# Patient Record
Sex: FEMALE | Race: NATIVE HAWAIIAN OR OTHER PACIFIC ISLANDER | Employment: OTHER | URBAN - METROPOLITAN AREA
[De-identification: names, ages, dates, MRNs, and addresses within clinical notes are randomized per-mention and may not be internally consistent; named-entity substitution may affect disease eponyms.]

---

## 2017-04-16 ENCOUNTER — HOSPITAL ENCOUNTER (INPATIENT)
Facility: HOSPITAL | Age: 82
LOS: 3 days | Discharge: HOSPICE | DRG: 871 | End: 2017-04-20
Attending: EMERGENCY MEDICINE | Admitting: FAMILY MEDICINE
Payer: MEDICARE

## 2017-04-16 ENCOUNTER — APPOINTMENT (OUTPATIENT)
Dept: GENERAL RADIOLOGY | Facility: HOSPITAL | Age: 82
DRG: 871 | End: 2017-04-16
Attending: EMERGENCY MEDICINE
Payer: MEDICARE

## 2017-04-16 DIAGNOSIS — R65.20 SEVERE SEPSIS(995.92): ICD-10-CM

## 2017-04-16 DIAGNOSIS — R06.00 DYSPNEA, UNSPECIFIED TYPE: Primary | ICD-10-CM

## 2017-04-16 DIAGNOSIS — A41.9 SEVERE SEPSIS(995.92): ICD-10-CM

## 2017-04-16 DIAGNOSIS — J18.9 COMMUNITY ACQUIRED PNEUMONIA: ICD-10-CM

## 2017-04-16 PROCEDURE — 71010 XR CHEST AP PORTABLE  (CPT=71010): CPT

## 2017-04-16 RX ORDER — NITROGLYCERIN 0.4 MG/1
0.4 TABLET SUBLINGUAL ONCE
Status: COMPLETED | OUTPATIENT
Start: 2017-04-16 | End: 2017-04-16

## 2017-04-16 RX ORDER — NITROGLYCERIN 0.4 MG/1
TABLET SUBLINGUAL
Status: COMPLETED
Start: 2017-04-16 | End: 2017-04-16

## 2017-04-16 RX ORDER — FUROSEMIDE 10 MG/ML
40 INJECTION INTRAMUSCULAR; INTRAVENOUS ONCE
Status: COMPLETED | OUTPATIENT
Start: 2017-04-16 | End: 2017-04-16

## 2017-04-16 RX ORDER — IPRATROPIUM BROMIDE AND ALBUTEROL SULFATE 2.5; .5 MG/3ML; MG/3ML
3 SOLUTION RESPIRATORY (INHALATION) ONCE
Status: COMPLETED | OUTPATIENT
Start: 2017-04-16 | End: 2017-04-16

## 2017-04-17 ENCOUNTER — APPOINTMENT (OUTPATIENT)
Dept: CT IMAGING | Facility: HOSPITAL | Age: 82
DRG: 871 | End: 2017-04-17
Attending: EMERGENCY MEDICINE
Payer: MEDICARE

## 2017-04-17 PROBLEM — Z91.81 AT RISK FOR FALLING: Status: ACTIVE | Noted: 2017-04-17

## 2017-04-17 PROBLEM — R06.00 DYSPNEA: Status: ACTIVE | Noted: 2017-04-17

## 2017-04-17 PROBLEM — J18.9 COMMUNITY ACQUIRED PNEUMONIA: Status: ACTIVE | Noted: 2017-04-17

## 2017-04-17 PROBLEM — E87.3 METABOLIC ALKALOSIS: Status: ACTIVE | Noted: 2017-04-17

## 2017-04-17 PROBLEM — D72.829 LEUKOCYTOSIS: Status: ACTIVE | Noted: 2017-04-17

## 2017-04-17 PROBLEM — R06.00 DYSPNEA, UNSPECIFIED TYPE: Status: ACTIVE | Noted: 2017-04-17

## 2017-04-17 PROBLEM — A41.9 SEVERE SEPSIS(995.92): Status: ACTIVE | Noted: 2017-04-17

## 2017-04-17 PROBLEM — R65.20 SEVERE SEPSIS(995.92): Status: ACTIVE | Noted: 2017-04-17

## 2017-04-17 PROBLEM — R73.9 HYPERGLYCEMIA: Status: ACTIVE | Noted: 2017-04-17

## 2017-04-17 PROCEDURE — 71275 CT ANGIOGRAPHY CHEST: CPT

## 2017-04-17 RX ORDER — MIRTAZAPINE 15 MG/1
15 TABLET, FILM COATED ORAL NIGHTLY
Status: DISCONTINUED | OUTPATIENT
Start: 2017-04-17 | End: 2017-04-20

## 2017-04-17 RX ORDER — CLOPIDOGREL BISULFATE 75 MG/1
75 TABLET ORAL DAILY
Status: DISCONTINUED | OUTPATIENT
Start: 2017-04-17 | End: 2017-04-20

## 2017-04-17 RX ORDER — HEPARIN SODIUM 5000 [USP'U]/ML
5000 INJECTION, SOLUTION INTRAVENOUS; SUBCUTANEOUS EVERY 8 HOURS SCHEDULED
Status: DISCONTINUED | OUTPATIENT
Start: 2017-04-17 | End: 2017-04-20

## 2017-04-17 RX ORDER — CARVEDILOL 12.5 MG/1
12.5 TABLET ORAL 2 TIMES DAILY WITH MEALS
Status: DISCONTINUED | OUTPATIENT
Start: 2017-04-17 | End: 2017-04-20

## 2017-04-17 RX ORDER — DEXTROSE AND SODIUM CHLORIDE 5; .45 G/100ML; G/100ML
INJECTION, SOLUTION INTRAVENOUS CONTINUOUS
Status: DISCONTINUED | OUTPATIENT
Start: 2017-04-17 | End: 2017-04-20

## 2017-04-17 RX ORDER — ASPIRIN 81 MG/1
81 TABLET, CHEWABLE ORAL DAILY
Status: DISCONTINUED | OUTPATIENT
Start: 2017-04-17 | End: 2017-04-20

## 2017-04-17 RX ORDER — IPRATROPIUM BROMIDE AND ALBUTEROL SULFATE 2.5; .5 MG/3ML; MG/3ML
3 SOLUTION RESPIRATORY (INHALATION) EVERY 4 HOURS PRN
Status: DISCONTINUED | OUTPATIENT
Start: 2017-04-17 | End: 2017-04-20

## 2017-04-17 RX ORDER — AMLODIPINE BESYLATE 5 MG/1
10 TABLET ORAL DAILY
Status: DISCONTINUED | OUTPATIENT
Start: 2017-04-17 | End: 2017-04-20

## 2017-04-17 RX ORDER — ENOXAPARIN SODIUM 100 MG/ML
40 INJECTION SUBCUTANEOUS DAILY
Status: DISCONTINUED | OUTPATIENT
Start: 2017-04-17 | End: 2017-04-17

## 2017-04-17 RX ORDER — PANTOPRAZOLE SODIUM 40 MG/1
40 TABLET, DELAYED RELEASE ORAL
Status: DISCONTINUED | OUTPATIENT
Start: 2017-04-18 | End: 2017-04-20

## 2017-04-17 RX ORDER — PRAVASTATIN SODIUM 20 MG
20 TABLET ORAL NIGHTLY
Status: DISCONTINUED | OUTPATIENT
Start: 2017-04-17 | End: 2017-04-20

## 2017-04-17 NOTE — CONSULTS
[facility]  Report of Consultation    Natalia Gaona Patient Status:  Inpatient    1932 MRN [id number]   [facility] 5NW-A Attending Demetrio Hester MD   Hosp Day # 1 PCP Sunny Avalos MD     Reason for Consultation:  Sia Mittal distress. Head: Normocephalic, without obvious abnormality, atraumatic. Eyes: Conjunctivae/corneas clear. No scleral icterus. No conjunctival     hemorrhage. Nose: Nares normal.   Throat: Lips, mucosa, and tongue normal.  No thrush noted.    Neck: S 0. 5   TEMP  98.0   DAVID  Positive   SITE  Right Radial   DEV  Bi-PAP   THGB  12.6         Cultures:       Radiology:  Reviewed  Personally       ASSESSMENT   1. PNA ( CAP? ) ,  RLLnot immunocompromised, not art risk for MRSA or PSA.  At  risk for aspirati

## 2017-04-17 NOTE — PLAN OF CARE
Diabetes/Glucose Control    • Glucose maintained within prescribed range Progressing        Patient/Family Goals    • Patient/Family Long Term Goal Progressing    • Patient/Family Short Term Goal Progressing

## 2017-04-17 NOTE — H&P
[facility]    PATIENT'S NAME: Chino Cano   ATTENDING PHYSICIAN: Joie De Los Santos M.D.    PATIENT ACCOUNT#:   [de-identified]    LOCATION:  [address] [place] [address]  MEDICAL RECORD #:   [id number]       YOB: 1932  ADMISSION DATE:       04/16/2 warm.  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 LABORATORY DATA:  Glucose was 214 in the emergency room. BUN was 30, creatinine 1.50. Lactic acid 2.6 consistent with sepsis.     Septic workup

## 2017-04-17 NOTE — PLAN OF CARE
Problem: Patient/Family Goals  Goal: Patient/Family Short Term Goal  Patient’s Short Term Goal: 4/17-resolve PNA  Interventions:   - 4/14-IV ABX,ivf  - See additional Care Plan goals for specific interventions   Outcome: Progressing

## 2017-04-17 NOTE — ED NOTES
Bi-pap was d/c'd by RT and patient has been doing well with O2 via NC at 2.5 L. Report called floor RN. Await transport to floor. Patient's family updated on plan of care.

## 2017-04-17 NOTE — PHYSICAL THERAPY NOTE
PHYSICAL THERAPY QUICK EVALUATION - INPATIENT    Room Number: 164/010-E  Evaluation Date: 4/17/2017  Presenting Problem: SOB  Physician Order: PT Eval and Treat    Problem List  Principal Problem:    Dyspnea, unspecified type  Active Problems:    Hyperglyc standing up from a chair with arms (e.g., wheelchair, bedside commode, etc.): Unable   -   Moving from lying on back to sitting on the side of the bed?: A Lot   How much help from another person does the patient currently need. ..   -   Moving to and from a ...    Patient was able to transfer At previous, functional level   Patient able to ambulate on level surfaces Unable before admission

## 2017-04-17 NOTE — ED NOTES
Ct scan ordered, screening sheet on chart, pt's IV access was discussed with CT tech. Pt has bilateral arm contractures and AC access is not available. Left arm IV was flushed and good blood return noted. Pt made ready for CTA of chest. Family at bedside.

## 2017-04-17 NOTE — RESPIRATORY THERAPY NOTE
Dr. Noelle Hameed called and asked me to take off A* from her Bipap. I took her off. At [phone]. Pt is tolerating well on 3L O2 like she uses at home. RR 24/ Hr 83/  Sats 100%.

## 2017-04-17 NOTE — ED PROVIDER NOTES
Patient Seen in: [facility] Emergency Department    History   Patient presents with:  Dyspnea ERENDIRA SOB (respiratory)    Stated Complaint: erendira    HPI    Patient is an 17-year-old female comes in emergency room for evaluation difficulty breathing.   Chester Olmedo (36.7 °C)   Temp src 04/16/17 2158 Temporal   SpO2 04/16/17 2158 88 %   O2 Device 04/16/17 2158 None (Room air)       Current:/104 mmHg  Pulse 84  Temp(Src) 98 °F (36.7 °C) (Temporal)  Resp 27  Ht 142.2 cm (4' 8\")  Wt 68.04 kg  BMI 33.65 kg/m2  SpO2 CBC W/ DIFFERENTIAL[[id number]]           Abnormal            Final result                 Please view results for these tests on the individual orders.    LACTIC ACID, PLASMA   LACTIC ACID, PLASMA   LACTIC ACID, PLASMA   BLOOD CULTURE   BLOOD CULTURE 68.04 kg  BMI 33.65 kg/m2  SpO2 100%     2:42 AM    Cardiac:  Regularity: Regular  Rate: Normal  Heart Sounds: No adventitious heart sounds    Lungs:   Right: Clear  Left: Clear    Peripheral Pulses:  Radial: Left 1+      Capillary Refill:  <3 Secs    Skin

## 2017-04-17 NOTE — PAYOR COMM NOTE
Attending Physician: Sylvester Melendez MD    Review Type: ADMISSION   Reviewer: Edwin Santiago       Date: April 17, 2017 - 12:08 PM  Payor: [address] [place] [address] Number: [id number]  Admit date: 4/16/2017  9:51 PM   Admitted from [facility] Route User    4/17/2017 1016 New Bag (none) Intravenous Natividad Soto, RN      Enoxaparin Sodium (LOVENOX) 40 MG/0.4ML injection 40 mg     Date Action Dose Route User    4/17/2017 0830 Given 40 mg Subcutaneous (Left Lower Abdomen) Natividad Soto respiratory rate of 42.   HEENT: Sclerae white, conjunctiva pink.  Pupils are reactive to light accommodation  Neck: Supple  Lungs: Patient has decreased aeration with expiratory wheezing noted in scattered fashion  Cardiovascular: Regular rate and rhythm,

## 2017-04-17 NOTE — SLP NOTE
ADULT SWALLOWING EVALUATION    ASSESSMENT & PLAN   ASSESSMENT  Patient seen for swallowing evaluation per protocol.  Patient participated in VFSS 4/5/2014 with resulting recommendation of regular consistency and thin liquids as there was no laryngeal penetr Regular; Thin liquids  Precautions: Aspiration    Patient/Family Goals:  To eat and drink safely    SWALLOWING HISTORY  Current Diet Consistency: NPO  Dysphagia History: As above  Imaging Results:   CT angiography from 4/17/17 revealed:  FINDINGS:  Multiplan Presentation: Self presentation;Spoon;Single sips; Consecutive swallows  Patient Positioning: Upright;Midline (in bed)    Oral Phase of Swallow: Impaired  Bolus Retrieval: Intact  Bilabial Seal: Intact  Bolus Formation: Intact  Bolus Propulsion: Impaired (d

## 2017-04-17 NOTE — PROGRESS NOTES
Pt arrived via cart from ED with daughter at bedside  Oriented to room and call light  Alert to self but communicating/holding conversation  Admission completed  Orders obtained

## 2017-04-18 ENCOUNTER — APPOINTMENT (OUTPATIENT)
Dept: GENERAL RADIOLOGY | Facility: HOSPITAL | Age: 82
DRG: 871 | End: 2017-04-18
Attending: FAMILY MEDICINE
Payer: MEDICARE

## 2017-04-18 ENCOUNTER — APPOINTMENT (OUTPATIENT)
Dept: GENERAL RADIOLOGY | Facility: HOSPITAL | Age: 82
DRG: 871 | End: 2017-04-18
Attending: INTERNAL MEDICINE
Payer: MEDICARE

## 2017-04-18 PROCEDURE — 99221 1ST HOSP IP/OBS SF/LOW 40: CPT | Performed by: NURSE PRACTITIONER

## 2017-04-18 PROCEDURE — 99497 ADVNCD CARE PLAN 30 MIN: CPT | Performed by: NURSE PRACTITIONER

## 2017-04-18 PROCEDURE — 71010 XR CHEST AP PORTABLE  (CPT=71010): CPT

## 2017-04-18 PROCEDURE — 74230 X-RAY XM SWLNG FUNCJ C+: CPT

## 2017-04-18 RX ORDER — POTASSIUM CHLORIDE 20 MEQ/1
40 TABLET, EXTENDED RELEASE ORAL EVERY 4 HOURS
Status: COMPLETED | OUTPATIENT
Start: 2017-04-18 | End: 2017-04-18

## 2017-04-18 RX ORDER — ALBUTEROL SULFATE 2.5 MG/3ML
2.5 SOLUTION RESPIRATORY (INHALATION) EVERY 4 HOURS PRN
Status: DISCONTINUED | OUTPATIENT
Start: 2017-04-19 | End: 2017-04-20

## 2017-04-18 RX ORDER — ALBUTEROL SULFATE 2.5 MG/3ML
10 SOLUTION RESPIRATORY (INHALATION)
Status: DISCONTINUED | OUTPATIENT
Start: 2017-04-18 | End: 2017-04-18

## 2017-04-18 RX ORDER — FUROSEMIDE 10 MG/ML
40 INJECTION INTRAMUSCULAR; INTRAVENOUS ONCE
Status: COMPLETED | OUTPATIENT
Start: 2017-04-18 | End: 2017-04-18

## 2017-04-18 RX ORDER — FUROSEMIDE 10 MG/ML
INJECTION INTRAMUSCULAR; INTRAVENOUS
Status: DISPENSED
Start: 2017-04-18 | End: 2017-04-18

## 2017-04-18 RX ORDER — ALBUTEROL SULFATE 2.5 MG/3ML
2.5 SOLUTION RESPIRATORY (INHALATION)
Status: DISCONTINUED | OUTPATIENT
Start: 2017-04-18 | End: 2017-04-18

## 2017-04-18 RX ORDER — ALBUTEROL SULFATE 2.5 MG/3ML
2.5 SOLUTION RESPIRATORY (INHALATION) EVERY 4 HOURS PRN
Status: DISCONTINUED | OUTPATIENT
Start: 2017-04-19 | End: 2017-04-18

## 2017-04-18 RX ORDER — ALBUTEROL SULFATE 2.5 MG/3ML
2.5 SOLUTION RESPIRATORY (INHALATION)
Status: DISPENSED | OUTPATIENT
Start: 2017-04-18 | End: 2017-04-19

## 2017-04-18 NOTE — OCCUPATIONAL THERAPY NOTE
Skilled OT order received chart reviewed. Per PT note, pt is from home with family. Pt is bed/wheelchair bound at baseline and is dependent for all care.  Will D/C skilled OT as pt is currently at her functional baseline and will continue to require total c

## 2017-04-18 NOTE — CM/SW NOTE
Pt is an 79 y/o woman admitted for dyspnea and sepsis. Pt has history of CVA and dementia and is unable to provide history. Spoke with pt's dtr, Nella Nagy ([phone]) who stated that pt lives with her.   She has multiple DME at home and receives care from f

## 2017-04-18 NOTE — PROGRESS NOTES
Assumed care of pt at 2330. Pt asleep at this time. Pt on bedrest, turn every two hours, pillow support, fall precautions in place. Pt maintaining o2 saturation >92% on 3L nasal cannula, . Pt is briefed, incontinent. IVF infusing, IV abx.  Frequent round

## 2017-04-18 NOTE — CM/SW NOTE
Spoke with Susy Mcneil, palliative care APN who met with pt's dtr, Peri Torre today. Pt's dtr requesting referral to [address] [place] [address]. Referral sent to [facility] via [facility] [address] with request that they contact pt's dtr to arrange meeting for tomorrow. QD done.   /

## 2017-04-18 NOTE — PLAN OF CARE
Drowsy, oriented to self. HX of CVA, right rided hemiparalysis. BLE contracted and BUE limited movement with contractures. On 3L O2, baseline 2-5L, rhonchi and bilateral wheezes today. Nebs now scheduled, IVF decreased, IV lasix overnight. IV abx.  ASpirati

## 2017-04-18 NOTE — SLP NOTE
ADULT VIDEOFLUOROSCOPIC SWALLOWING STUDY    Admission Date: 4/16/2017  Evaluation Date: 04/18/2017  Radiologist: Dr. Mikey Carbajal    Dear Dr. Luz Marina Kiran,  This letter is to inform you of Marlyn Stone Videofluoroscopic Swallowing Study results and/or plan eating and currently with respiratory changes concerning for aspiration. Family/Patient Goals:   To eat and drink safely     ASSESSMENT   DYSPHAGIA ASSESSMENT  Test completed in conjunction with Radiologist.  Patient Positioned: Upright;Midline;CONOR chair 4/19/17.     GOALS  Goal #1  The patient will tolerate regular consistency and thin liquids without overt signs or symptoms of aspiration with 100 % accuracy over 1-2 session(s).    Goal #2  The patient/family/caregiver will demonstrate understanding and im

## 2017-04-18 NOTE — PROGRESS NOTES
[facility]  Progress Note    Ramses Lowe Patient Status:  Inpatient    1932 MRN [id number]   [facility] 5NW-A Attending Wilda Huerta MD   Hosp Day # 2 PCP Usha Hurst MD     Subjective: The patient is rotated to the left. The opacity projecting over the medial right upper lobe is not seen with certainty. New interstitial changes in the right lung base suggesting pneumonitis.  Minimal changes within the left lower lobe    medially.      4/1 mg Oral Daily   aspirin chewable tab 81 mg 81 mg Oral Daily   carvedilol (COREG) tab 12.5 mg 12.5 mg Oral BID with meals   Clopidogrel Bisulfate (PLAVIX) tab 75 mg 75 mg Oral Daily   mirtazapine (REMERON) tab 15 mg 15 mg Oral Nightly   Pantoprazole Sodium

## 2017-04-18 NOTE — PROGRESS NOTES
[facility] Patient Status:  Inpatient    1932 MRN [id number]   [facility] 5NW-A Attending Bam Ponce MD   Hosp Day # 2 PCP Melba Hernández MD     Louis Hua is a 80year old female patient.     P HEENT exam.    Lungs: Tachypnea. There are wheezes and rhonchi. Heart: S1 normal and S2 normal.    Extremities: Decreased range of motion. Neurological: Patient is alert. Skin:  Warm and dry. Abdomen: Abdomen is soft and rigid.   Bowel sounds

## 2017-04-18 NOTE — PLAN OF CARE
CONFUSION    • Confusion, delirium, dementia or psychosis is improved or at baseline Progressing        Impaired Swallowing    • Minimize aspiration risk Progressing        MUSCULOSKELETAL - ADULT    • Maintain proper alignment of affected body part Progre

## 2017-04-18 NOTE — CONSULTS
[facility]  [id number]  Hospital Day #2  Date of Consult: 04/18/17       Reason for Consultation: Consult requested for evaluation of palliative care needs and goals of care discussion 56.    Spiritual needs addressed: Referral placed for Spiritual Care    Disposition: ongoing goals of care discussions    Thank you for allowing the Palliative Care Team to participate in the care of your patient. We will continue to follow.     Cathy Blanton

## 2017-04-19 PROCEDURE — 99231 SBSQ HOSP IP/OBS SF/LOW 25: CPT | Performed by: NURSE PRACTITIONER

## 2017-04-19 NOTE — PROGRESS NOTES
[facility]  Progress Note    Ld Cano Patient Status:  Inpatient    1932 MRN [id number]   [facility] 5NW-A Attending Melvin Ayala MD   Hosp Day # 3 PCP Aaliyah Florentino MD     Subjective: angles.      =====  CONCLUSION:  The patient is rotated to the left. The opacity projecting over the medial right upper lobe is not seen with certainty. New interstitial changes in the right lung base suggesting pneumonitis.  Minimal changes within the left infusion  Intravenous Continuous   AmLODIPine Besylate (NORVASC) tab 10 mg 10 mg Oral Daily   aspirin chewable tab 81 mg 81 mg Oral Daily   carvedilol (COREG) tab 12.5 mg 12.5 mg Oral BID with meals   Clopidogrel Bisulfate (PLAVIX) tab 75 mg 75 mg Oral Lynette Martinez

## 2017-04-19 NOTE — PLAN OF CARE
CONFUSION    • Confusion, delirium, dementia or psychosis is improved or at baseline Progressing        Diabetes/Glucose Control    • Glucose maintained within prescribed range Progressing        Impaired Functional Mobility    • Achieve highest/safest lev

## 2017-04-19 NOTE — PALLIATIVE CARE NOTE
[facility] Follow Up      Ld Cano  [id number]       Patient seen and evaluated, family at bedside. Pt is sleeping soundly, respirations even and unlabored, appears comfortable.       Assessment/Recommendations:  Pr

## 2017-04-19 NOTE — PROGRESS NOTES
[facility] Patient Status:  Inpatient    1932 MRN [id number]   [facility] 5NW-A Attending Florencio Bateman MD   Hosp Day # 3 PCP MD Tori Ortegaramón is a 80year old female patient.     P pain (wheezing). Vital signs: (most recent): Blood pressure 137/77, pulse 65, temperature 98.1 °F (36.7 °C), temperature source Oral, resp. rate 24, height 142.2 cm (4' 8\"), weight 150 lb (68.04 kg), SpO2 99 %.   Vital signs are normal.    HEENT: Normal

## 2017-04-19 NOTE — PLAN OF CARE
Problem: Patient/Family Goals  Goal: Patient/Family Short Term Goal  Patient’s Short Term Goal: 4/17-resolve PNA  4/18 AM: drink water  4/18 PM: sleep  4/19-resolve pna  Interventions:  4/19-iv abx   -video swallow today, aspiration precautions  - See corazon

## 2017-04-19 NOTE — SLP NOTE
SPEECH DAILY NOTE - INPATIENT    Evaluation Date: 04/19/2017    ASSESSMENT & PLAN   ASSESSMENT  Pt seen for dysphagia tx to assess tolerance with recommended diet, ensure proper utilization of aspiration precautions and provide pt/family education.   Patien implementation of aspiration precautions and swallow strategies independently over 1-2 session(s).     Goal #3   Patient will participate in VFSS MET                      FOLLOW UP  Follow Up Needed: Yes  SLP Follow-up Date: 04/20/17  Number of Visits to Indu Chowdhury

## 2017-04-19 NOTE — PROGRESS NOTES
Patient presented semi-supine in bed; VSS and agreeable to participate. Performed BUE/BLE AAROM regimen in all planes as recommended by PT/OT. Upon completion, patient made comfortable in bed with call light in reach. RN Andres aware of session.

## 2017-04-19 NOTE — PALLIATIVE CARE NOTE
PCSW contacted by Cynthia Galloway stating pts family signed [facility] Consents and an RN will be out this afternoon to evaluate pt. Anticipated d/c is 4/20; home with [facility].     Eugene Luz LCSW  Palliative Care   pager [phone], phone [phone]

## 2017-04-19 NOTE — PLAN OF CARE
Impaired Swallowing    • Minimize aspiration risk Progressing        Patient/Family Goals    • Patient/Family Short Term Goal Progressing        RESPIRATORY - ADULT    • Achieves optimal ventilation and oxygenation Progressing        RN assumed care of pt

## 2017-04-20 ENCOUNTER — APPOINTMENT (OUTPATIENT)
Dept: GENERAL RADIOLOGY | Facility: HOSPITAL | Age: 82
DRG: 871 | End: 2017-04-20
Attending: NURSE PRACTITIONER
Payer: MEDICARE

## 2017-04-20 VITALS
WEIGHT: 150 LBS | BODY MASS INDEX: 33.74 KG/M2 | DIASTOLIC BLOOD PRESSURE: 65 MMHG | RESPIRATION RATE: 18 BRPM | HEART RATE: 62 BPM | OXYGEN SATURATION: 98 % | HEIGHT: 56 IN | TEMPERATURE: 98 F | SYSTOLIC BLOOD PRESSURE: 123 MMHG

## 2017-04-20 PROCEDURE — 71010 XR CHEST AP PORTABLE  (CPT=71010): CPT

## 2017-04-20 RX ORDER — DOXYCYCLINE HYCLATE 100 MG/1
100 CAPSULE ORAL EVERY 12 HOURS SCHEDULED
Qty: 10 CAPSULE | Refills: 0 | Status: SHIPPED | OUTPATIENT
Start: 2017-04-20 | End: 2018-10-17

## 2017-04-20 RX ORDER — IPRATROPIUM BROMIDE AND ALBUTEROL SULFATE 2.5; .5 MG/3ML; MG/3ML
3 SOLUTION RESPIRATORY (INHALATION)
Qty: 120 VIAL | Refills: 3 | Status: SHIPPED | OUTPATIENT
Start: 2017-04-20 | End: 2017-04-20

## 2017-04-20 RX ORDER — DOXYCYCLINE HYCLATE 100 MG/1
100 CAPSULE ORAL EVERY 12 HOURS SCHEDULED
Qty: 8 CAPSULE | Refills: 0 | Status: CANCELLED
Start: 2017-04-20

## 2017-04-20 RX ORDER — DOXYCYCLINE HYCLATE 100 MG/1
100 CAPSULE ORAL EVERY 12 HOURS SCHEDULED
Status: DISCONTINUED | OUTPATIENT
Start: 2017-04-20 | End: 2017-04-20

## 2017-04-20 RX ORDER — IPRATROPIUM BROMIDE AND ALBUTEROL SULFATE 2.5; .5 MG/3ML; MG/3ML
3 SOLUTION RESPIRATORY (INHALATION)
Qty: 120 VIAL | Refills: 3 | Status: SHIPPED | OUTPATIENT
Start: 2017-04-20

## 2017-04-20 RX ORDER — ALBUTEROL SULFATE 2.5 MG/3ML
2.5 SOLUTION RESPIRATORY (INHALATION) EVERY 4 HOURS PRN
Qty: 120 CONTAINER | Refills: 0 | Status: CANCELLED
Start: 2017-04-20

## 2017-04-20 RX ORDER — ALBUTEROL SULFATE 2.5 MG/3ML
2.5 SOLUTION RESPIRATORY (INHALATION) EVERY 4 HOURS PRN
Qty: 120 ML | Refills: 0 | Status: SHIPPED | OUTPATIENT
Start: 2017-04-20

## 2017-04-20 RX ORDER — DOXYCYCLINE HYCLATE 100 MG/1
100 CAPSULE ORAL EVERY 12 HOURS SCHEDULED
Qty: 10 CAPSULE | Refills: 0 | Status: SHIPPED | OUTPATIENT
Start: 2017-04-20 | End: 2017-04-20

## 2017-04-20 NOTE — PROGRESS NOTES
[facility]  Progress Note    Milford Oven Patient Status:  Inpatient    1932 MRN [id number]   [facility] 5NW-A Attending Mary Rodriguez MD   Hosp Day # 4 PCP Gareth Mariscal MD     Subjective: pulmonary embolus  The ascending aorta is mildly dilated measuring 4.1 cm in diameter. A coronary arterial calcifications are noted. Calcified mediastinal lymph nodes are noted.   No pleural or pericardial effusions  Visualized portions of solid abdominal o Dyspnea     Dyspnea, unspecified type     Community acquired pneumonia     Severe sepsis ([facility] [address]     At risk for falling     Dysphagia     Goals of care, counseling/discussion     Palliative care by specialist        Assessment:    · CAP/ Suspect Aspiration

## 2017-04-20 NOTE — PALLIATIVE CARE NOTE
Jeanmarie [facility] Work Follow Up    Discussion:  PCSW contacted pts uvaldo/Gayathri who states she met with [facility] last night and signed consents but an RN still needs to eval pt and equipment delivery.  PCSW co

## 2017-04-20 NOTE — SLP NOTE
SPEECH DAILY NOTE - INPATIENT    Evaluation Date: 04/20/2017    ASSESSMENT & PLAN   ASSESSMENT  Pt seen for meal assess/dysphagia therapy to monitor po tolerance of recommended diet and ensure adherence to aspiration precautions.   Pt alert and sitting upri

## 2017-04-21 NOTE — CM/SW NOTE
Patient discharged on 4/20/17 as previously planned.        04/21/17 0800   Discharge disposition   Discharged to: Hospice/Home   Name of [address]   Discharge transportation Other (comment)  (as arranged by [facility])

## 2017-04-28 NOTE — DISCHARGE SUMMARY
[facility]    Discharge Summary    Della Campos Patient Status:  Inpatient    1932 MRN [id number]   [facility] 5NW-A Attending No att. providers found   [address] Day # 4 PCP Laila Simpson MD     Date of Admission: 2017 Pulmonary toilet and daughter Lyudmila Peraza opted for Hospice at home. Consultations: Pulmonary. [facility]    Procedures:  None    Complications: None.     Discharge Condition: Stable    Discharge Medications:      Discharge Medications      START taking t Tabs   Commonly known as:  PLAVIX           simvastatin 5 MG Tabs   Commonly known as:  ZOCOR                Where to Get Your Medications      Please  your prescriptions at the location directed by your doctor or nurse     Bring a paper prescriptio

## 2018-11-25 PROBLEM — I10 ESSENTIAL HYPERTENSION: Status: ACTIVE | Noted: 2018-11-25

## 2018-11-25 PROBLEM — I50.9 CHRONIC CONGESTIVE HEART FAILURE, UNSPECIFIED HEART FAILURE TYPE (HCC): Status: ACTIVE | Noted: 2018-11-25

## 2018-11-25 PROBLEM — K21.9 GERD (GASTROESOPHAGEAL REFLUX DISEASE): Status: ACTIVE | Noted: 2018-11-25

## 2018-11-25 PROBLEM — I69.30 HISTORY OF CEREBROVASCULAR ACCIDENT (CVA) WITH RESIDUAL DEFICIT: Status: ACTIVE | Noted: 2018-11-25

## 2019-06-30 ENCOUNTER — APPOINTMENT (OUTPATIENT)
Dept: GENERAL RADIOLOGY | Facility: HOSPITAL | Age: 84
DRG: 395 | End: 2019-06-30
Attending: EMERGENCY MEDICINE
Payer: MEDICARE

## 2019-06-30 ENCOUNTER — HOSPITAL ENCOUNTER (INPATIENT)
Facility: HOSPITAL | Age: 84
LOS: 1 days | Discharge: HOME HEALTH | DRG: 395 | End: 2019-07-02
Attending: EMERGENCY MEDICINE | Admitting: INTERNAL MEDICINE
Payer: MEDICARE

## 2019-06-30 DIAGNOSIS — I10 BENIGN ESSENTIAL HTN: ICD-10-CM

## 2019-06-30 DIAGNOSIS — F03.90 DEMENTIA WITHOUT BEHAVIORAL DISTURBANCE, UNSPECIFIED DEMENTIA TYPE (HCC): ICD-10-CM

## 2019-06-30 DIAGNOSIS — E11.65 TYPE 2 DIABETES MELLITUS WITH HYPERGLYCEMIA, WITHOUT LONG-TERM CURRENT USE OF INSULIN (HCC): ICD-10-CM

## 2019-06-30 DIAGNOSIS — E78.5 HYPERLIPIDEMIA, UNSPECIFIED HYPERLIPIDEMIA TYPE: ICD-10-CM

## 2019-06-30 DIAGNOSIS — K66.8 PNEUMOPERITONEUM: Primary | ICD-10-CM

## 2019-06-30 DIAGNOSIS — I25.10 CORONARY ARTERY DISEASE INVOLVING NATIVE CORONARY ARTERY OF NATIVE HEART WITHOUT ANGINA PECTORIS: ICD-10-CM

## 2019-06-30 DIAGNOSIS — I50.9 CHRONIC CONGESTIVE HEART FAILURE, UNSPECIFIED HEART FAILURE TYPE (HCC): ICD-10-CM

## 2019-06-30 LAB
ALBUMIN SERPL-MCNC: 3.8 G/DL (ref 3.4–5)
ALBUMIN/GLOB SERPL: 1 {RATIO} (ref 1–2)
ALP LIVER SERPL-CCNC: 81 U/L (ref 55–142)
ALT SERPL-CCNC: 19 U/L (ref 13–56)
ANION GAP SERPL CALC-SCNC: 3 MMOL/L (ref 0–18)
ANTIBODY SCREEN: NEGATIVE
APTT PPP: 21.6 SECONDS (ref 25.4–36.1)
AST SERPL-CCNC: 15 U/L (ref 15–37)
BASOPHILS # BLD AUTO: 0.05 X10(3) UL (ref 0–0.2)
BASOPHILS NFR BLD AUTO: 0.5 %
BILIRUB SERPL-MCNC: 0.4 MG/DL (ref 0.1–2)
BUN BLD-MCNC: 35 MG/DL (ref 7–18)
BUN/CREAT SERPL: 19.4 (ref 10–20)
CALCIUM BLD-MCNC: 9.6 MG/DL (ref 8.5–10.1)
CHLORIDE SERPL-SCNC: 106 MMOL/L (ref 98–112)
CO2 SERPL-SCNC: 30 MMOL/L (ref 21–32)
CREAT BLD-MCNC: 1.8 MG/DL (ref 0.55–1.02)
DEPRECATED RDW RBC AUTO: 41.8 FL (ref 35.1–46.3)
EOSINOPHIL # BLD AUTO: 0.24 X10(3) UL (ref 0–0.7)
EOSINOPHIL NFR BLD AUTO: 2.4 %
ERYTHROCYTE [DISTWIDTH] IN BLOOD BY AUTOMATED COUNT: 12.6 % (ref 11–15)
GLOBULIN PLAS-MCNC: 3.7 G/DL (ref 2.8–4.4)
GLUCOSE BLD-MCNC: 170 MG/DL (ref 70–99)
HCT VFR BLD AUTO: 38.8 % (ref 35–48)
HGB BLD-MCNC: 13.1 G/DL (ref 12–16)
IMM GRANULOCYTES # BLD AUTO: 0.03 X10(3) UL (ref 0–1)
IMM GRANULOCYTES NFR BLD: 0.3 %
INR BLD: 0.96 (ref 0.9–1.1)
LACTATE SERPL-SCNC: 1.8 MMOL/L (ref 0.4–2)
LYMPHOCYTES # BLD AUTO: 1.6 X10(3) UL (ref 1–4)
LYMPHOCYTES NFR BLD AUTO: 15.7 %
M PROTEIN MFR SERPL ELPH: 7.5 G/DL (ref 6.4–8.2)
MCH RBC QN AUTO: 30.8 PG (ref 26–34)
MCHC RBC AUTO-ENTMCNC: 33.8 G/DL (ref 31–37)
MCV RBC AUTO: 91.1 FL (ref 80–100)
MONOCYTES # BLD AUTO: 0.74 X10(3) UL (ref 0.1–1)
MONOCYTES NFR BLD AUTO: 7.3 %
NEUTROPHILS # BLD AUTO: 7.52 X10 (3) UL (ref 1.5–7.7)
NEUTROPHILS # BLD AUTO: 7.52 X10(3) UL (ref 1.5–7.7)
NEUTROPHILS NFR BLD AUTO: 73.8 %
OSMOLALITY SERPL CALC.SUM OF ELEC: 300 MOSM/KG (ref 275–295)
PLATELET # BLD AUTO: 271 10(3)UL (ref 150–450)
POTASSIUM SERPL-SCNC: 4.3 MMOL/L (ref 3.5–5.1)
PSA SERPL DL<=0.01 NG/ML-MCNC: 13.2 SECONDS (ref 12.5–14.7)
RBC # BLD AUTO: 4.26 X10(6)UL (ref 3.8–5.3)
RH BLOOD TYPE: POSITIVE
SODIUM SERPL-SCNC: 139 MMOL/L (ref 136–145)
WBC # BLD AUTO: 10.2 X10(3) UL (ref 4–11)

## 2019-06-30 PROCEDURE — 96365 THER/PROPH/DIAG IV INF INIT: CPT

## 2019-06-30 PROCEDURE — 85025 COMPLETE CBC W/AUTO DIFF WBC: CPT | Performed by: EMERGENCY MEDICINE

## 2019-06-30 PROCEDURE — 96361 HYDRATE IV INFUSION ADD-ON: CPT

## 2019-06-30 PROCEDURE — 99285 EMERGENCY DEPT VISIT HI MDM: CPT

## 2019-06-30 PROCEDURE — 86901 BLOOD TYPING SEROLOGIC RH(D): CPT | Performed by: EMERGENCY MEDICINE

## 2019-06-30 PROCEDURE — 85610 PROTHROMBIN TIME: CPT | Performed by: EMERGENCY MEDICINE

## 2019-06-30 PROCEDURE — 80053 COMPREHEN METABOLIC PANEL: CPT | Performed by: EMERGENCY MEDICINE

## 2019-06-30 PROCEDURE — 71045 X-RAY EXAM CHEST 1 VIEW: CPT | Performed by: EMERGENCY MEDICINE

## 2019-06-30 PROCEDURE — 86850 RBC ANTIBODY SCREEN: CPT | Performed by: EMERGENCY MEDICINE

## 2019-06-30 PROCEDURE — 93010 ELECTROCARDIOGRAM REPORT: CPT

## 2019-06-30 PROCEDURE — 93005 ELECTROCARDIOGRAM TRACING: CPT

## 2019-06-30 PROCEDURE — 86900 BLOOD TYPING SEROLOGIC ABO: CPT | Performed by: EMERGENCY MEDICINE

## 2019-06-30 PROCEDURE — 85730 THROMBOPLASTIN TIME PARTIAL: CPT | Performed by: EMERGENCY MEDICINE

## 2019-06-30 PROCEDURE — 83605 ASSAY OF LACTIC ACID: CPT | Performed by: EMERGENCY MEDICINE

## 2019-06-30 RX ORDER — SODIUM CHLORIDE 9 MG/ML
INJECTION, SOLUTION INTRAVENOUS CONTINUOUS
Status: CANCELLED | OUTPATIENT
Start: 2019-06-30 | End: 2019-07-01

## 2019-06-30 RX ORDER — SODIUM CHLORIDE 9 MG/ML
1000 INJECTION, SOLUTION INTRAVENOUS ONCE
Status: COMPLETED | OUTPATIENT
Start: 2019-06-30 | End: 2019-07-01

## 2019-06-30 RX ORDER — MORPHINE SULFATE 4 MG/ML
4 INJECTION, SOLUTION INTRAMUSCULAR; INTRAVENOUS EVERY 30 MIN PRN
Status: CANCELLED | OUTPATIENT
Start: 2019-06-30 | End: 2019-07-01

## 2019-06-30 RX ORDER — ONDANSETRON 2 MG/ML
4 INJECTION INTRAMUSCULAR; INTRAVENOUS EVERY 4 HOURS PRN
Status: CANCELLED | OUTPATIENT
Start: 2019-06-30

## 2019-07-01 PROBLEM — E78.5 HYPERLIPIDEMIA, UNSPECIFIED HYPERLIPIDEMIA TYPE: Status: ACTIVE | Noted: 2019-07-01

## 2019-07-01 PROBLEM — I25.10 CORONARY ARTERY DISEASE INVOLVING NATIVE CORONARY ARTERY OF NATIVE HEART WITHOUT ANGINA PECTORIS: Status: ACTIVE | Noted: 2019-07-01

## 2019-07-01 PROBLEM — E11.65 TYPE 2 DIABETES MELLITUS WITH HYPERGLYCEMIA, WITHOUT LONG-TERM CURRENT USE OF INSULIN (HCC): Status: ACTIVE | Noted: 2019-07-01

## 2019-07-01 PROBLEM — F03.90 DEMENTIA WITHOUT BEHAVIORAL DISTURBANCE, UNSPECIFIED DEMENTIA TYPE (HCC): Status: ACTIVE | Noted: 2019-07-01

## 2019-07-01 PROBLEM — I10 BENIGN ESSENTIAL HTN: Status: ACTIVE | Noted: 2019-07-01

## 2019-07-01 LAB
ANION GAP SERPL CALC-SCNC: 4 MMOL/L (ref 0–18)
ATRIAL RATE: 61 BPM
BUN BLD-MCNC: 34 MG/DL (ref 7–18)
BUN/CREAT SERPL: 21.3 (ref 10–20)
CALCIUM BLD-MCNC: 9.3 MG/DL (ref 8.5–10.1)
CHLORIDE SERPL-SCNC: 109 MMOL/L (ref 98–112)
CO2 SERPL-SCNC: 28 MMOL/L (ref 21–32)
CREAT BLD-MCNC: 1.6 MG/DL (ref 0.55–1.02)
DEPRECATED RDW RBC AUTO: 42.3 FL (ref 35.1–46.3)
ERYTHROCYTE [DISTWIDTH] IN BLOOD BY AUTOMATED COUNT: 12.3 % (ref 11–15)
GLUCOSE BLD-MCNC: 118 MG/DL (ref 70–99)
GLUCOSE BLD-MCNC: 121 MG/DL (ref 70–99)
HCT VFR BLD AUTO: 38.3 % (ref 35–48)
HGB BLD-MCNC: 12.5 G/DL (ref 12–16)
MCH RBC QN AUTO: 30.3 PG (ref 26–34)
MCHC RBC AUTO-ENTMCNC: 32.6 G/DL (ref 31–37)
MCV RBC AUTO: 92.7 FL (ref 80–100)
OSMOLALITY SERPL CALC.SUM OF ELEC: 301 MOSM/KG (ref 275–295)
P AXIS: -4 DEGREES
P-R INTERVAL: 186 MS
PLATELET # BLD AUTO: 260 10(3)UL (ref 150–450)
POTASSIUM SERPL-SCNC: 4.3 MMOL/L (ref 3.5–5.1)
Q-T INTERVAL: 438 MS
QRS DURATION: 130 MS
QTC CALCULATION (BEZET): 440 MS
R AXIS: 22 DEGREES
RBC # BLD AUTO: 4.13 X10(6)UL (ref 3.8–5.3)
SODIUM SERPL-SCNC: 141 MMOL/L (ref 136–145)
T AXIS: -4 DEGREES
VENTRICULAR RATE: 61 BPM
WBC # BLD AUTO: 10.2 X10(3) UL (ref 4–11)

## 2019-07-01 PROCEDURE — C9113 INJ PANTOPRAZOLE SODIUM, VIA: HCPCS | Performed by: SURGERY

## 2019-07-01 PROCEDURE — 85027 COMPLETE CBC AUTOMATED: CPT | Performed by: INTERNAL MEDICINE

## 2019-07-01 PROCEDURE — 83036 HEMOGLOBIN GLYCOSYLATED A1C: CPT | Performed by: HOSPITALIST

## 2019-07-01 PROCEDURE — 80048 BASIC METABOLIC PNL TOTAL CA: CPT | Performed by: INTERNAL MEDICINE

## 2019-07-01 PROCEDURE — 92610 EVALUATE SWALLOWING FUNCTION: CPT

## 2019-07-01 PROCEDURE — 82962 GLUCOSE BLOOD TEST: CPT

## 2019-07-01 RX ORDER — ONDANSETRON 2 MG/ML
4 INJECTION INTRAMUSCULAR; INTRAVENOUS EVERY 6 HOURS PRN
Status: DISCONTINUED | OUTPATIENT
Start: 2019-07-01 | End: 2019-07-02

## 2019-07-01 RX ORDER — CARVEDILOL 12.5 MG/1
12.5 TABLET ORAL 2 TIMES DAILY WITH MEALS
Status: DISCONTINUED | OUTPATIENT
Start: 2019-07-01 | End: 2019-07-02

## 2019-07-01 RX ORDER — TOBRAMYCIN AND DEXAMETHASONE 3; 1 MG/ML; MG/ML
1 SUSPENSION/ DROPS OPHTHALMIC
Status: DISCONTINUED | OUTPATIENT
Start: 2019-07-01 | End: 2019-07-02

## 2019-07-01 RX ORDER — METOCLOPRAMIDE HYDROCHLORIDE 5 MG/ML
5 INJECTION INTRAMUSCULAR; INTRAVENOUS EVERY 8 HOURS PRN
Status: DISCONTINUED | OUTPATIENT
Start: 2019-07-01 | End: 2019-07-02

## 2019-07-01 RX ORDER — HEPARIN SODIUM 5000 [USP'U]/ML
5000 INJECTION, SOLUTION INTRAVENOUS; SUBCUTANEOUS EVERY 8 HOURS SCHEDULED
Status: DISCONTINUED | OUTPATIENT
Start: 2019-07-01 | End: 2019-07-02

## 2019-07-01 RX ORDER — FUROSEMIDE 40 MG/1
40 TABLET ORAL DAILY
Status: DISCONTINUED | OUTPATIENT
Start: 2019-07-02 | End: 2019-07-02

## 2019-07-01 RX ORDER — ALBUTEROL SULFATE 2.5 MG/3ML
2.5 SOLUTION RESPIRATORY (INHALATION) EVERY 4 HOURS PRN
Status: DISCONTINUED | OUTPATIENT
Start: 2019-07-01 | End: 2019-07-02

## 2019-07-01 RX ORDER — AMLODIPINE BESYLATE 5 MG/1
10 TABLET ORAL DAILY
Status: DISCONTINUED | OUTPATIENT
Start: 2019-07-02 | End: 2019-07-02

## 2019-07-01 RX ORDER — SODIUM CHLORIDE 9 MG/ML
INJECTION, SOLUTION INTRAVENOUS CONTINUOUS
Status: DISCONTINUED | OUTPATIENT
Start: 2019-07-01 | End: 2019-07-02

## 2019-07-01 RX ORDER — IPRATROPIUM BROMIDE AND ALBUTEROL SULFATE 2.5; .5 MG/3ML; MG/3ML
3 SOLUTION RESPIRATORY (INHALATION)
Status: DISCONTINUED | OUTPATIENT
Start: 2019-07-01 | End: 2019-07-01

## 2019-07-01 RX ORDER — HYDROMORPHONE HYDROCHLORIDE 1 MG/ML
0.2 INJECTION, SOLUTION INTRAMUSCULAR; INTRAVENOUS; SUBCUTANEOUS EVERY 2 HOUR PRN
Status: DISCONTINUED | OUTPATIENT
Start: 2019-07-01 | End: 2019-07-02

## 2019-07-01 RX ORDER — IPRATROPIUM BROMIDE AND ALBUTEROL SULFATE 2.5; .5 MG/3ML; MG/3ML
3 SOLUTION RESPIRATORY (INHALATION) EVERY 4 HOURS PRN
Status: DISCONTINUED | OUTPATIENT
Start: 2019-07-01 | End: 2019-07-02

## 2019-07-01 RX ORDER — PANTOPRAZOLE SODIUM 40 MG/1
40 TABLET, DELAYED RELEASE ORAL EVERY 12 HOURS
Status: DISCONTINUED | OUTPATIENT
Start: 2019-07-01 | End: 2019-07-02

## 2019-07-01 NOTE — ED INITIAL ASSESSMENT (HPI)
Pt here with c.o possible bowel perforation per family. Pt family states worsening confusion, low grade fever, and R sided abdominal pain.   Pt has CT scan at [facility] yesterday

## 2019-07-01 NOTE — SLP NOTE
ADULT SWALLOWING EVALUATION    ASSESSMENT    ASSESSMENT/OVERALL IMPRESSION:  Patient seen at bedside for swallow evaluation d/t history of modified diet. Patient was admitted for abdominal pain with PMH of dementia, CHF, and CVA.  Patient known to this depa straws; Slow rate;Small bites and sips  Aspiration Precautions: Upright position; Slow rate;Small bites and sips; No straw  Medication Administration Recommendations: One pill at a time; Whole in puree  Treatment Plan/Recommendations: Videofluoroscopic swallow Within Functional Limits  Strength:  Within Functional Limits  Tone: Within Functional Limits  Range of Motion: Within Functional Limits  Rate of Motion: Within Functional Limits    Voice Quality: Clear  Respiratory Status: Unlabored  Consistencies Trialed:

## 2019-07-01 NOTE — CONSULTS
[facility]  Report of Consultation    Della Campos Patient Status:  Inpatient    1932 MRN [id number]   [facility] 3NW-A Attending Lory Velázquez MD   Hosp Day # 0 PCP Jose Guadalupe Early MD     Reason for Consultation:    Pneu HCl (REGLAN) injection 5 mg, 5 mg, Intravenous, Q8H PRN  •  Piperacillin Sod-Tazobactam So (ZOSYN) 3.375 g in dextrose 5 % 100 mL ADD-vantage, 3.375 g, Intravenous, Q8H  •  0.9% NaCl infusion, , Intravenous, Continuous  •  tobramycin-dexamethasone (TOBRADE Wheezing. Disp: 120 mL Rfl: 0       Review of Systems:    10 point review performed pertinent positives and negatives per history of present illness.     Physical Exam:    Blood pressure 107/82, pulse 92, temperature 98.1 °F (36.7 °C), temperature source Or abdomen. This could represent colonic interposition, but free intraperitoneal cannot entirely be excluded on the basis of this exam. A limited CT of the diaphragmatic region without contrast could resolve this question as clinically indicated.     LOVELY 6/29/2019  DATE OF SERVICE: 06.29.2019 CT ABDOMEN(W+WO)PELVIS([facility] ONLY)(CPT=74178) CLINICAL INDICATION: Abnormal findings on diagnostic imaging of other abdominal regions, including retroperitoneum. COMPARISON STUDY: Obstructive series 6/25/2019.  [address] .[facility] [address]  N inflammatory changes of colon, small bowel or appendix. There is air within the walls of a few small bowel loops in the mid and left abdomen. There is also a pneumoperitoneum.  A moderate amount of stool is present in the rectum with the suggestion of a rec 1. Cardiomegaly. Mild increased interstitial and airspace opacities are seen involving both upper lobes and right lower lobe may be chronic, correlate for any signs mild edema or early infectious process.     Dictated by: Quang Olivares MD on 6/30/2019 treat her with antibiotics. I think is reasonable to treat her for peptic ulcer disease with initiation of Protonix. We will continue a conservative approach.   We will follow along with you        Karina Orantes  7/1/2019

## 2019-07-01 NOTE — PLAN OF CARE
VSS,afebrile. Oriented/pleasantly confused at times. Denies nausea/vomiting or abdominal pain. Incontinent of bowel & bladder. Safety measures maintained, will monitor.      Problem: RESPIRATORY - ADULT  Goal: Achieves optimal ventilation and oxygenatio ordered antiemetic medications  - Provide nonpharmacologic comfort measures as appropriate  - Advance diet as tolerated, if ordered  - Obtain nutritional consult as needed  - Evaluate fluid balance  Outcome: Progressing     Problem: METABOLIC/FLUID AND OMARI

## 2019-07-01 NOTE — PROGRESS NOTES
Pharmacy Note: Renal dose adjustment for Metoclopramide (Reglan)  Geoff Walker has been prescribed Metoclopramide (Reglan) 10 mg every 8 hours as needed. Estimated Creatinine Clearance: 20.9 mL/min (A) (based on SCr of 1.8 mg/dL (H)).     Her isaac

## 2019-07-01 NOTE — ED PROVIDER NOTES
Patient Seen in: [facility] Emergency Department    History   Patient presents with:  Abnormal Result (metabolic, cardiac)  Altered Mental Status (neurologic)    Stated Complaint: abnormal ct, ams    HPI    80year-old female with a history of demen Smoker      Smokeless tobacco: Never Used    Alcohol use: Not on file    Drug use: Not on file      Review of Systems    Positive for stated complaint: abnormal ct, ams  Other systems are as noted in HPI. Constitutional and vital signs reviewed.       All components within normal limits   PTT, ACTIVATED - Abnormal; Notable for the following components:    PTT 21.6 (*)     All other components within normal limits   PROTHROMBIN TIME (PT) - Normal   LACTIC ACID, PLASMA - Normal   CBC WITH DIFFERENTIAL WITH PL [pneumoperitoneum]. The patient had good response to these medications. Patient continued to be observed here in the emergency department. Patient remained stable throughout the emergency department observation period.     Had a discussion with the pat provider specified.       Medications Prescribed:  Current Discharge Medication List

## 2019-07-01 NOTE — H&P
[facility] Hospitalist History and Physical      CC: Abd pain    PCP: Haley Queen MD    History of Present Illness: Patient is a 80year old female with PMH sig for sig dementia, hx of stroke, DM here due to findings of free intra-peritoneal air on outpatie tablet Rfl: 3   mirtazapine 15 MG Oral Tab Take 1 tablet (15 mg total) by mouth nightly. Disp: 90 tablet Rfl: 3   Pantoprazole Sodium 40 MG Oral Tab EC Take 1 tablet (40 mg total) by mouth 2 (two) times daily before meals.  Disp: 180 tablet Rfl: 3   aspirin thyromegaly  Pulm: Lungs clear bilaterally, normal respiratory effort  CV: Heart with regular rate and rhythm, no murmur.   GI: Abdomen soft, nontender, nondistended, no organomegaly, bowel sounds present  Ext: No cyanosis, clubbing, or edema  Skin: Skin wa perforation  - Afebrile, exam is very benign, hungry  - No sign of localizing inflammation, ischemic, obstruction on imaging  - Seen by surgery and feel likely has contained bowel perforation  - Family has voiced preference for conservative management  - I

## 2019-07-01 NOTE — PLAN OF CARE
Alert to self. Confused of place and situation. Unable to recognize daughter at bedside. NPO. Lungs clear, diminished at bases. Sats resting at 95-97 on room air. Brief in place d/t incontinence of bowel and bladder. Total lift. IVF infusing.  Zosyn for abx breathe, Incentive Spirometry  - Assess the need for suctioning and perform as needed  - Assess and instruct to report SOB or any respiratory difficulty  - Respiratory Therapy support as indicated  - Manage/alleviate anxiety  - Monitor for signs/symptoms o

## 2019-07-01 NOTE — PLAN OF CARE
Problem: Impaired Swallowing  Goal: Minimize aspiration risk  Description  Interventions:  - Patient should be alert and upright for all feedings (90 degrees preferred)  - Offer food and liquids at a slow rate  - No straws  - Encourage small bites of binh

## 2019-07-01 NOTE — PROGRESS NOTES
NURSING ADMISSION NOTE      Patient admitted via Cart  Oriented to room. Safety precautions initiated. Bed in low position. Call light in reach. Received patient from ER in stable condition, daughter at bedside. A&Ox2, denies abdominal pain.    +

## 2019-07-02 ENCOUNTER — APPOINTMENT (OUTPATIENT)
Dept: GENERAL RADIOLOGY | Facility: HOSPITAL | Age: 84
DRG: 395 | End: 2019-07-02
Attending: INTERNAL MEDICINE
Payer: MEDICARE

## 2019-07-02 VITALS
SYSTOLIC BLOOD PRESSURE: 107 MMHG | OXYGEN SATURATION: 93 % | HEIGHT: 58 IN | RESPIRATION RATE: 18 BRPM | WEIGHT: 129.13 LBS | BODY MASS INDEX: 27.1 KG/M2 | TEMPERATURE: 98 F | DIASTOLIC BLOOD PRESSURE: 58 MMHG | HEART RATE: 55 BPM

## 2019-07-02 LAB
EST. AVERAGE GLUCOSE BLD GHB EST-MCNC: 137 MG/DL (ref 68–126)
HBA1C MFR BLD HPLC: 6.4 % (ref ?–5.7)

## 2019-07-02 PROCEDURE — 94640 AIRWAY INHALATION TREATMENT: CPT

## 2019-07-02 PROCEDURE — 92611 MOTION FLUOROSCOPY/SWALLOW: CPT

## 2019-07-02 PROCEDURE — 74230 X-RAY XM SWLNG FUNCJ C+: CPT | Performed by: INTERNAL MEDICINE

## 2019-07-02 RX ORDER — AMOXICILLIN AND CLAVULANATE POTASSIUM 875; 125 MG/1; MG/1
1 TABLET, FILM COATED ORAL 2 TIMES DAILY
Qty: 20 TABLET | Refills: 0 | Status: SHIPPED | OUTPATIENT
Start: 2019-07-02 | End: 2019-07-12

## 2019-07-02 NOTE — HOME CARE LIAISON
MET WITH PTNT AND OFFERED CHOICE  OF AGENCIES. PTNT AGREEABLE TO [facility]. MET WITH PTNT TO DISCUSS HOME HEALTH SERVICES AND COVERAGE CRITERIA. PTNT AGREEABLE TO Simone Le. PTNT GIVEN RESIDENTIAL BROCHURE.  RESIDENTIAL WITH PROVIDE SN/SLP ON DIS

## 2019-07-02 NOTE — PLAN OF CARE
PT IS ORIENTED TO SELF ONLY. PT TOLERATED DIET, NO COUGHING OF SIGHS OF CHOCKING NOTED. PT IS ON ASPIRATION PRECAUTION. WILL CONTINUE TO MONITOR.

## 2019-07-02 NOTE — PAYOR COMM NOTE
--------------  ADMISSION REVIEW     Payor: [facility] Carolina Felton #:  [id number]  Authorization Number: [id number]    ED Provider Notes      Patient Seen in: [facility] Emergency Department    History   Patient presents with:  Abnormal Past Surgical History:   Procedure Laterality Date   • ANGIOPLASTY (CORONARY)  2014         Physical Exam     ED Triage Vitals [06/30/19 2209]   /86   Pulse 61   Resp 24   Temp 98.2 °F (36.8 °C)   Temp src Temporal   SpO2 94 %   O2 Device None Normal   CBC WITH DIFFERENTIAL WITH PLATELET    Narrative: The following orders were created for panel order CBC WITH DIFFERENTIAL WITH PLATELET.   Procedure                               Abnormality         Status                     --------- with hyperglycemia, without long-term current use of insulin (HCC)  Coronary artery disease involving native coronary artery of native heart without angina pectoris  Dementia without behavioral disturbance, unspecified dementia type             H&P - H&P N 07/01/2019    CREATSERUM 1.60 07/01/2019    BUN 34 07/01/2019     07/01/2019    K 4.3 07/01/2019     07/01/2019    CO2 28.0 07/01/2019     07/01/2019    CA 9.3 07/01/2019    ALB 3.8 06/30/2019    ALKPHO 81 06/30/2019    BILT 0.4 06/30/20 unclear     Plan:     I expect the patient has a perforation which has sealed with omental fat or walled off and surrounding tissues. No clear source of inflammation except some pneumatosis within a few small bowel segments.   She has had a perforation for

## 2019-07-02 NOTE — PROGRESS NOTES
[facility] Hospitalist Progress Note                                                                   [address] [facility]  7/17/1932    CC: FU abd pain    Interval History:  - No pain complaints CO2 30.0 28.0           ROS: no change to ROS from my documentation yesterday, except as otherwise noted in the Interval History above.     Assessment/Plan:    # Pneumatosis of the bowels with free air, bowel perforation  - Afebrile, exam is very benign

## 2019-07-02 NOTE — PLAN OF CARE
VSS,afebrile. Tolerating chopped diet + nectar thick liquids. Ate 75% of dinner. Denies abdominal pain. Baseline incontinence of bowel/bladder. + flatus, -bm. Safety measures maintained. Will monitor.      Problem: RESPIRATORY - ADULT  Goal: Achieves opt Evaluate effectiveness of ordered antiemetic medications  - Provide nonpharmacologic comfort measures as appropriate  - Advance diet as tolerated, if ordered  - Obtain nutritional consult as needed  - Evaluate fluid balance  Outcome: Progressing

## 2019-07-02 NOTE — PLAN OF CARE
Written and verbal discharge instructions given to family verbalize understanding. IV discontinued in ngio-cath tip intact, site free from redness, swelling, or drainage, patient denies pain at site. Dressing applied. Prescription given.   Patient left ramiro

## 2019-07-02 NOTE — PROGRESS NOTES
[facility]  Progress Note    Brenna Lima Patient Status:  Inpatient    1932 MRN [id number]   [facility] 3NW-A Attending Flor Carrington MD   Hosp Day # 1 PCP Praneeth Haider MD     Subjective:    Denies pain.   Resting comf management      Suresh Marin  7/2/2019

## 2019-07-02 NOTE — CM/SW NOTE
07/02/19 1500   CM/SW Referral Data   Referral Source Physician   Reason for Referral Discharge planning   Informant Children   Patient Info   Patient's Mental Status Confused   Patient lives with Children  (daughter, son in law and grand daughter)   Pa team to see if they can assist in that regard. There is nothing else she can think of that the need for help at home / to remain available for support and/or discharge planning.        Haris Houston RN, [facility]    [phone]

## 2019-07-02 NOTE — VIDEO SWALLOW STUDY NOTE
ADULT VIDEOFLUOROSCOPIC SWALLOWING STUDY    Admission Date: 6/30/2019  Evaluation Date: 07/02/19  Radiologist: Dr. Eleno Lopez   Diet Recommendations - Solids: Mechanical soft chopped  Diet Recommendations - Liquid: Nectar thick    Further correlate for any signs mild edema or early infectious process.            Dictated by: Jose L Melgar MD on 6/30/2019 at 23:42       Approved by: Jose L Melgar MD         Reason for Referral: RN dysphagia screen  Coughing and wheezing with swallowin Limits(increased time but functional)  Triggered at: Base of tongue  Residue Severity, Location: (none)  Laryngeal Penetration: None  Tracheal Aspiration: None  Penetration Aspiration Scale Score: Score 2: Material enters the airway, remains above the voca therapy    EDUCATION/INSTRUCTION  Reviewed results and recommendations with patient/family/caregiver. Agreement/Understanding verbalized and all questions answered to their apparent satisfaction.     INTERDISCIPLINARY COMMUNICATION  Reviewed results with R

## 2019-07-03 NOTE — CM/SW NOTE
07/03/19 1500   Discharge disposition   Expected discharge disposition Home-Health   Name of [facility] Zamora 1 services after discharge Employed caregiver   E provider   (Spencer Leavitt)   Discharge transportation Private car

## 2019-07-16 ENCOUNTER — APPOINTMENT (OUTPATIENT)
Dept: CT IMAGING | Facility: HOSPITAL | Age: 84
End: 2019-07-16
Attending: EMERGENCY MEDICINE
Payer: MEDICARE

## 2019-07-16 ENCOUNTER — HOSPITAL ENCOUNTER (OUTPATIENT)
Facility: HOSPITAL | Age: 84
Setting detail: OBSERVATION
LOS: 1 days | Discharge: HOME | End: 2019-07-17
Attending: EMERGENCY MEDICINE | Admitting: HOSPITALIST
Payer: MEDICARE

## 2019-07-16 ENCOUNTER — APPOINTMENT (OUTPATIENT)
Dept: GENERAL RADIOLOGY | Facility: HOSPITAL | Age: 84
End: 2019-07-16
Attending: EMERGENCY MEDICINE
Payer: MEDICARE

## 2019-07-16 DIAGNOSIS — E86.0 DEHYDRATION: ICD-10-CM

## 2019-07-16 DIAGNOSIS — G93.40 ENCEPHALOPATHY ACUTE: ICD-10-CM

## 2019-07-16 DIAGNOSIS — R09.02 HYPOXIA: Primary | ICD-10-CM

## 2019-07-16 PROBLEM — N17.9 ACUTE KIDNEY INJURY (HCC): Status: ACTIVE | Noted: 2019-07-16

## 2019-07-16 PROBLEM — D64.9 ANEMIA: Status: ACTIVE | Noted: 2019-07-16

## 2019-07-16 PROBLEM — E87.1 HYPONATREMIA: Status: ACTIVE | Noted: 2019-07-16

## 2019-07-16 LAB
ALBUMIN SERPL-MCNC: 3.2 G/DL (ref 3.4–5)
ALBUMIN/GLOB SERPL: 0.7 {RATIO} (ref 1–2)
ALP LIVER SERPL-CCNC: 72 U/L (ref 55–142)
ALT SERPL-CCNC: 20 U/L (ref 13–56)
ANION GAP SERPL CALC-SCNC: 5 MMOL/L (ref 0–18)
AST SERPL-CCNC: 20 U/L (ref 15–37)
BASOPHILS # BLD AUTO: 0.03 X10(3) UL (ref 0–0.2)
BASOPHILS NFR BLD AUTO: 0.3 %
BILIRUB SERPL-MCNC: 0.4 MG/DL (ref 0.1–2)
BILIRUB UR QL STRIP.AUTO: NEGATIVE
BUN BLD-MCNC: 33 MG/DL (ref 7–18)
BUN/CREAT SERPL: 17.1 (ref 10–20)
CALCIUM BLD-MCNC: 9.8 MG/DL (ref 8.5–10.1)
CHLORIDE SERPL-SCNC: 103 MMOL/L (ref 98–112)
CLARITY UR REFRACT.AUTO: CLEAR
CO2 SERPL-SCNC: 27 MMOL/L (ref 21–32)
COLOR UR AUTO: YELLOW
CREAT BLD-MCNC: 1.93 MG/DL (ref 0.55–1.02)
DEPRECATED RDW RBC AUTO: 39.1 FL (ref 35.1–46.3)
EOSINOPHIL # BLD AUTO: 0.3 X10(3) UL (ref 0–0.7)
EOSINOPHIL NFR BLD AUTO: 3 %
ERYTHROCYTE [DISTWIDTH] IN BLOOD BY AUTOMATED COUNT: 12.1 % (ref 11–15)
GLOBULIN PLAS-MCNC: 4.4 G/DL (ref 2.8–4.4)
GLUCOSE BLD-MCNC: 121 MG/DL (ref 70–99)
GLUCOSE UR STRIP.AUTO-MCNC: NEGATIVE MG/DL
HCT VFR BLD AUTO: 32.7 % (ref 35–48)
HGB BLD-MCNC: 11 G/DL (ref 12–16)
IMM GRANULOCYTES # BLD AUTO: 0.04 X10(3) UL (ref 0–1)
IMM GRANULOCYTES NFR BLD: 0.4 %
KETONES UR STRIP.AUTO-MCNC: NEGATIVE MG/DL
LACTATE SERPL-SCNC: 1.1 MMOL/L (ref 0.4–2)
LEUKOCYTE ESTERASE UR QL STRIP.AUTO: NEGATIVE
LYMPHOCYTES # BLD AUTO: 1.17 X10(3) UL (ref 1–4)
LYMPHOCYTES NFR BLD AUTO: 11.9 %
M PROTEIN MFR SERPL ELPH: 7.6 G/DL (ref 6.4–8.2)
MCH RBC QN AUTO: 29.6 PG (ref 26–34)
MCHC RBC AUTO-ENTMCNC: 33.6 G/DL (ref 31–37)
MCV RBC AUTO: 87.9 FL (ref 80–100)
MONOCYTES # BLD AUTO: 0.61 X10(3) UL (ref 0.1–1)
MONOCYTES NFR BLD AUTO: 6.2 %
NEUTROPHILS # BLD AUTO: 7.69 X10 (3) UL (ref 1.5–7.7)
NEUTROPHILS # BLD AUTO: 7.69 X10(3) UL (ref 1.5–7.7)
NEUTROPHILS NFR BLD AUTO: 78.2 %
NITRITE UR QL STRIP.AUTO: NEGATIVE
NT-PROBNP SERPL-MCNC: 1224 PG/ML (ref ?–450)
OSMOLALITY SERPL CALC.SUM OF ELEC: 289 MOSM/KG (ref 275–295)
PH UR STRIP.AUTO: 5 [PH] (ref 4.5–8)
PLATELET # BLD AUTO: 308 10(3)UL (ref 150–450)
POTASSIUM SERPL-SCNC: 4.5 MMOL/L (ref 3.5–5.1)
PROCALCITONIN SERPL-MCNC: 0.11 NG/ML
PROT UR STRIP.AUTO-MCNC: NEGATIVE MG/DL
RBC # BLD AUTO: 3.72 X10(6)UL (ref 3.8–5.3)
RBC UR QL AUTO: NEGATIVE
SODIUM SERPL-SCNC: 135 MMOL/L (ref 136–145)
SP GR UR STRIP.AUTO: 1.01 (ref 1–1.03)
TROPONIN I SERPL-MCNC: <0.045 NG/ML (ref ?–0.04)
UROBILINOGEN UR STRIP.AUTO-MCNC: <2 MG/DL
WBC # BLD AUTO: 9.8 X10(3) UL (ref 4–11)

## 2019-07-16 PROCEDURE — 71045 X-RAY EXAM CHEST 1 VIEW: CPT | Performed by: EMERGENCY MEDICINE

## 2019-07-16 PROCEDURE — 74176 CT ABD & PELVIS W/O CONTRAST: CPT | Performed by: EMERGENCY MEDICINE

## 2019-07-16 RX ORDER — SODIUM CHLORIDE 9 MG/ML
INJECTION, SOLUTION INTRAVENOUS CONTINUOUS
Status: DISCONTINUED | OUTPATIENT
Start: 2019-07-16 | End: 2019-07-17

## 2019-07-16 RX ORDER — PANTOPRAZOLE SODIUM 40 MG/1
40 TABLET, DELAYED RELEASE ORAL
Status: DISCONTINUED | OUTPATIENT
Start: 2019-07-17 | End: 2019-07-17

## 2019-07-16 RX ORDER — ACETAMINOPHEN 325 MG/1
650 TABLET ORAL EVERY 6 HOURS PRN
Status: DISCONTINUED | OUTPATIENT
Start: 2019-07-16 | End: 2019-07-17

## 2019-07-16 RX ORDER — HEPARIN SODIUM 5000 [USP'U]/ML
5000 INJECTION, SOLUTION INTRAVENOUS; SUBCUTANEOUS EVERY 8 HOURS SCHEDULED
Status: DISCONTINUED | OUTPATIENT
Start: 2019-07-16 | End: 2019-07-17

## 2019-07-16 RX ORDER — ALBUTEROL SULFATE 2.5 MG/3ML
2.5 SOLUTION RESPIRATORY (INHALATION) EVERY 4 HOURS PRN
Status: DISCONTINUED | OUTPATIENT
Start: 2019-07-16 | End: 2019-07-17

## 2019-07-16 RX ORDER — IPRATROPIUM BROMIDE AND ALBUTEROL SULFATE 2.5; .5 MG/3ML; MG/3ML
3 SOLUTION RESPIRATORY (INHALATION)
Status: DISCONTINUED | OUTPATIENT
Start: 2019-07-16 | End: 2019-07-17

## 2019-07-16 RX ORDER — CARVEDILOL 12.5 MG/1
12.5 TABLET ORAL 2 TIMES DAILY WITH MEALS
Status: DISCONTINUED | OUTPATIENT
Start: 2019-07-16 | End: 2019-07-17

## 2019-07-16 RX ORDER — ASPIRIN 81 MG/1
81 TABLET ORAL DAILY
Status: DISCONTINUED | OUTPATIENT
Start: 2019-07-17 | End: 2019-07-17

## 2019-07-16 NOTE — ED NOTES
Patient to ED with daughter, complaining of abdominal pain to LLQ that started today. LBM 24 hours ago, refusing to eat and drink. + wet depends this am, has been dry all day   + low grade fever over the weekend.   +fatigue today, saying things that aren'

## 2019-07-16 NOTE — ED PROVIDER NOTES
Patient Seen in: [facility] Emergency Department    History   Patient presents with:  Altered Mental Status (neurologic)  Abdomen/Flank Pain (GI/)    Stated Complaint: abd pain    HPI    80year-old with a history of diabetes, previous stroke with Used    Alcohol use: Not on file    Drug use: Not on file      Review of Systems    Positive for stated complaint: abd pain  Other systems are as noted in HPI. Constitutional and vital signs reviewed.       All other systems reviewed and negative except as Notable for the following components:    RBC 3.72 (*)     HGB 11.0 (*)     HCT 32.7 (*)     All other components within normal limits   LACTIC ACID, PLASMA - Normal   TROPONIN I - Normal   CBC WITH DIFFERENTIAL WITH PLATELET    Narrative:      The following 6:07 pm    Follow-up:  No follow-up provider specified.       Medications Prescribed:  Current Discharge Medication List              Present on Admission  Date Reviewed: 4/3/2014          ICD-10-CM Noted POA    * (Principal) Hypoxia R09.02 7/16/2019 Unknow

## 2019-07-16 NOTE — ED INITIAL ASSESSMENT (HPI)
Patient presents with c/o low grade fever for a few days, LLQ pain this morning. She has decreased urine output. Lethargic, combative and not her norm. She has not eaten anything today either.

## 2019-07-17 VITALS
DIASTOLIC BLOOD PRESSURE: 53 MMHG | TEMPERATURE: 98 F | RESPIRATION RATE: 18 BRPM | HEART RATE: 52 BPM | WEIGHT: 120 LBS | SYSTOLIC BLOOD PRESSURE: 132 MMHG | BODY MASS INDEX: 25.19 KG/M2 | OXYGEN SATURATION: 100 % | HEIGHT: 58 IN

## 2019-07-17 LAB
ANION GAP SERPL CALC-SCNC: 4 MMOL/L (ref 0–18)
BASOPHILS # BLD AUTO: 0.02 X10(3) UL (ref 0–0.2)
BASOPHILS NFR BLD AUTO: 0.3 %
BUN BLD-MCNC: 25 MG/DL (ref 7–18)
BUN/CREAT SERPL: 16.6 (ref 10–20)
CALCIUM BLD-MCNC: 8.7 MG/DL (ref 8.5–10.1)
CHLORIDE SERPL-SCNC: 110 MMOL/L (ref 98–112)
CO2 SERPL-SCNC: 26 MMOL/L (ref 21–32)
CREAT BLD-MCNC: 1.51 MG/DL (ref 0.55–1.02)
DEPRECATED RDW RBC AUTO: 41.1 FL (ref 35.1–46.3)
EOSINOPHIL # BLD AUTO: 0.38 X10(3) UL (ref 0–0.7)
EOSINOPHIL NFR BLD AUTO: 6.2 %
ERYTHROCYTE [DISTWIDTH] IN BLOOD BY AUTOMATED COUNT: 12.3 % (ref 11–15)
GLUCOSE BLD-MCNC: 84 MG/DL (ref 70–99)
HCT VFR BLD AUTO: 29.5 % (ref 35–48)
HGB BLD-MCNC: 9.7 G/DL (ref 12–16)
IMM GRANULOCYTES # BLD AUTO: 0.02 X10(3) UL (ref 0–1)
IMM GRANULOCYTES NFR BLD: 0.3 %
LYMPHOCYTES # BLD AUTO: 1.51 X10(3) UL (ref 1–4)
LYMPHOCYTES NFR BLD AUTO: 24.8 %
MCH RBC QN AUTO: 30 PG (ref 26–34)
MCHC RBC AUTO-ENTMCNC: 32.9 G/DL (ref 31–37)
MCV RBC AUTO: 91.3 FL (ref 80–100)
MONOCYTES # BLD AUTO: 0.56 X10(3) UL (ref 0.1–1)
MONOCYTES NFR BLD AUTO: 9.2 %
NEUTROPHILS # BLD AUTO: 3.6 X10 (3) UL (ref 1.5–7.7)
NEUTROPHILS # BLD AUTO: 3.6 X10(3) UL (ref 1.5–7.7)
NEUTROPHILS NFR BLD AUTO: 59.2 %
OSMOLALITY SERPL CALC.SUM OF ELEC: 294 MOSM/KG (ref 275–295)
PLATELET # BLD AUTO: 229 10(3)UL (ref 150–450)
POTASSIUM SERPL-SCNC: 3.8 MMOL/L (ref 3.5–5.1)
RBC # BLD AUTO: 3.23 X10(6)UL (ref 3.8–5.3)
SODIUM SERPL-SCNC: 140 MMOL/L (ref 136–145)
WBC # BLD AUTO: 6.1 X10(3) UL (ref 4–11)

## 2019-07-17 PROCEDURE — 99222 1ST HOSP IP/OBS MODERATE 55: CPT | Performed by: CLINICAL NURSE SPECIALIST

## 2019-07-17 RX ORDER — AMLODIPINE BESYLATE 10 MG/1
5 TABLET ORAL DAILY
Qty: 90 TABLET | Refills: 3 | Status: SHIPPED | OUTPATIENT
Start: 2019-07-17

## 2019-07-17 NOTE — DIETARY NOTE
[facility]    NUTRITION INITIAL ASSESSMENT    Pt does not meet malnutrition criteria.     NUTRITION DIAGNOSIS/PROBLEM:    Inadequate oral intake related to swallowing difficulties and confusion/mental status as evidenced by reported weight loss of 5-10 61.2 kg (135 lb)  04/05/14 : 62 kg (136 lb 11 oz)      NUTRITION:  Diet:General  Oral Supplements: Ensure HP and Magic Cup once daily    FOOD/NUTRITION RELATED HISTORY:  Appetite: Poor  Intake: less than 50%  Intake Meeting Needs: No, but supplements to ma

## 2019-07-17 NOTE — H&P
[facility] Hospitalist History and Physical      CC: Weakness    PCP: Erin Orr MD    History of Present Illness: Patient is a 80year old female with PMH sig for hx of CVA, advanced dementia  bedbound at baseline recently admitted to EDW with abd pain an Rfl: 3   Pantoprazole Sodium 40 MG Oral Tab EC Take 1 tablet (40 mg total) by mouth 2 (two) times daily before meals. Disp: 180 tablet Rfl: 3   aspirin 81 MG Oral Tab Take 81 mg by mouth daily.  Disp:  Rfl:    ACCU-CHEK SOFTCLIX LANCETS Does not apply Misc effort  CV: Heart with regular rate and rhythm, no murmur. Normal PMI. GI: Abdomen soft, nontender, nondistended, no organomegaly, bowel sounds present  Ext: No cyanosis, clubbing, or edema  Skin: Skin warm and dry. No rashes or lesions.   MSK:  No digi looks dry and has been eating poorly though unclear why decrease in appetite  - No clear localizing s/s of infection- CT Abd/Pelvis wo acute infectious process- no longer noting free air/evidence of bowel perf as on prior admit  - Currently no abd pain on

## 2019-07-17 NOTE — CM/SW NOTE
Spoke with pt's RN who stated plan for discharge to home today. Pt current with [facility] and [facility] for palliative care. Pt's family interested in meeting with [facility] prior to DC for potential hospice at DC.   RN contacted Ghazala Rivera and faxed refer

## 2019-07-17 NOTE — HOME CARE LIAISON
Patient is current with [facility] for RN HHA and Speech. Please obtain resumption orders prior to discharge. Thank you.

## 2019-07-17 NOTE — OCCUPATIONAL THERAPY NOTE
OT orders received; chart reviewed. Pt has assist with all ADL/transfers at baseline. No skilled therapy is indicated at this time. Will sign off from OT.

## 2019-07-17 NOTE — PROGRESS NOTES
07/17/19 7792   Clinical Encounter Type   Visited With Patient and family together;Health care provider   Routine Visit Introduction   Continue Visiting No  (unless requested)   Patient's Supportive Strategies/Resources   ( honored patient by Christohper Price

## 2019-07-17 NOTE — CM/SW NOTE
Patient failed inpatient criteria. Second level of review completed and supports observation. UR committee in agreement. Discussed with Dr. Gume Echevarria  who approves observation status. MOON given to the patient and order written.

## 2019-07-17 NOTE — PLAN OF CARE
Problem: Patient/Family Goals  Goal: Patient/Family Long Term Goal  Description  Patient's Long Term Goal: 7/17 PM:  No more hospital visits    Interventions:  - 7/17 PM:  Administer prescribed meds, Q2 turns, IVF. Monitor labs, vitals.   - See additional

## 2019-07-18 LAB
ATRIAL RATE: 52 BPM
P AXIS: 54 DEGREES
P-R INTERVAL: 204 MS
Q-T INTERVAL: 502 MS
QRS DURATION: 158 MS
QTC CALCULATION (BEZET): 466 MS
R AXIS: 4 DEGREES
T AXIS: -1 DEGREES
VENTRICULAR RATE: 52 BPM

## 2019-07-18 NOTE — DISCHARGE SUMMARY
General Medicine Discharge Summary     Patient ID:  Della Campos  80year old  7/17/1932    Admit date: 7/16/2019    Discharge date and time:  7/17/19    Attending Physician: No att. providers fo CT Abd/Pelvis wo acute infectious process- no longer noting free air/evidence of bowel perf as on prior admit  - Currently no abd pain on exam  - Having normal BM's per family  - UA looks OK  - Labs OK   - Check Procal- negative  - Admit for IVF and monito mg by mouth daily. , Historical    ACCU-CHEK SOFTCLIX LANCETS Does not apply Misc  USE AS DIRECTED, Normal, Disp-100 each, R-3    ACCU-CHEK GURU PLUS In Vitro Strip  USE AS DIRECTED DAILY, Normal, Disp-100 strip, R-1    HYDROmorphone HCl 1 MG/ML Oral Liqui

## 2019-07-18 NOTE — PAYOR COMM NOTE
FER OBSERVATION    PLACE IN OBSERVATION (Order #[id number]) on 7/16/19    --------------  ADMISSION REVIEW     Payor: [address] [place], [place] [address]Leandro Harrington #:  [id number]  Authorization Number: [id number]
